# Patient Record
Sex: FEMALE | Race: WHITE | NOT HISPANIC OR LATINO | URBAN - METROPOLITAN AREA
[De-identification: names, ages, dates, MRNs, and addresses within clinical notes are randomized per-mention and may not be internally consistent; named-entity substitution may affect disease eponyms.]

---
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
## 2019-08-16 ENCOUNTER — APPOINTMENT (RX ONLY)
Dept: URBAN - METROPOLITAN AREA CLINIC 167 | Facility: CLINIC | Age: 19
Setting detail: DERMATOLOGY
End: 2019-08-16

## 2019-08-16 DIAGNOSIS — L53.1 ERYTHEMA ANNULARE CENTRIFUGUM: ICD-10-CM

## 2019-08-16 DIAGNOSIS — D22 MELANOCYTIC NEVI: ICD-10-CM

## 2019-08-16 DIAGNOSIS — L21.8 OTHER SEBORRHEIC DERMATITIS: ICD-10-CM

## 2019-08-16 PROBLEM — L30.9 DERMATITIS, UNSPECIFIED: Status: ACTIVE | Noted: 2019-08-16

## 2019-08-16 PROBLEM — D22.5 MELANOCYTIC NEVI OF TRUNK: Status: ACTIVE | Noted: 2019-08-16

## 2019-08-16 PROCEDURE — 87220 TISSUE EXAM FOR FUNGI: CPT

## 2019-08-16 PROCEDURE — ? PRESCRIPTION

## 2019-08-16 PROCEDURE — ? COUNSELING

## 2019-08-16 PROCEDURE — ? INVENTORY

## 2019-08-16 PROCEDURE — ? KOH PREP

## 2019-08-16 PROCEDURE — ? ADDITIONAL NOTES

## 2019-08-16 PROCEDURE — 99213 OFFICE O/P EST LOW 20 MIN: CPT

## 2019-08-16 RX ORDER — CICLOPIROX 1 %
SHAMPOO TOPICAL
Qty: 2 | Refills: 8 | Status: ERX

## 2019-08-16 RX ORDER — BETAMETHASONE VALERATE 1 MG/ML
LOTION CUTANEOUS
Qty: 1 | Refills: 6 | Status: ERX | COMMUNITY
Start: 2019-08-16

## 2019-08-16 RX ADMIN — BETAMETHASONE VALERATE: 1 LOTION CUTANEOUS at 00:00

## 2019-08-16 ASSESSMENT — LOCATION DETAILED DESCRIPTION DERM
LOCATION DETAILED: LEFT LATERAL ABDOMEN
LOCATION DETAILED: INFERIOR THORACIC SPINE
LOCATION DETAILED: RIGHT MEDIAL FRONTAL SCALP

## 2019-08-16 ASSESSMENT — LOCATION ZONE DERM
LOCATION ZONE: SCALP
LOCATION ZONE: TRUNK

## 2019-08-16 ASSESSMENT — LOCATION SIMPLE DESCRIPTION DERM
LOCATION SIMPLE: ABDOMEN
LOCATION SIMPLE: RIGHT SCALP
LOCATION SIMPLE: UPPER BACK

## 2019-08-16 NOTE — PROCEDURE: ADDITIONAL NOTES
Detail Level: Detailed
Additional Notes: Reassurance provided for patient.  Advised pt to use betamethasone valerate lotion.

## 2020-11-23 ENCOUNTER — APPOINTMENT (RX ONLY)
Dept: URBAN - METROPOLITAN AREA CLINIC 167 | Facility: CLINIC | Age: 20
Setting detail: DERMATOLOGY
End: 2020-11-23

## 2020-11-23 DIAGNOSIS — L21.8 OTHER SEBORRHEIC DERMATITIS: ICD-10-CM

## 2020-11-23 DIAGNOSIS — D22 MELANOCYTIC NEVI: ICD-10-CM

## 2020-11-23 PROBLEM — D23.71 OTHER BENIGN NEOPLASM OF SKIN OF RIGHT LOWER LIMB, INCLUDING HIP: Status: ACTIVE | Noted: 2020-11-23

## 2020-11-23 PROBLEM — D22.5 MELANOCYTIC NEVI OF TRUNK: Status: ACTIVE | Noted: 2020-11-23

## 2020-11-23 PROCEDURE — ? COUNSELING

## 2020-11-23 PROCEDURE — 99213 OFFICE O/P EST LOW 20 MIN: CPT

## 2020-11-23 PROCEDURE — ? PRESCRIPTION

## 2020-11-23 PROCEDURE — ? PRESCRIPTION MEDICATION MANAGEMENT

## 2020-11-23 PROCEDURE — ? FULL BODY SKIN EXAM

## 2020-11-23 RX ORDER — FLUCONAZOLE 150 MG/1
TABLET ORAL
Qty: 4 | Refills: 0 | Status: ERX | COMMUNITY
Start: 2020-11-23

## 2020-11-23 RX ORDER — CLOBETASOL PROPIONATE 0.5 MG/ML
SOLUTION TOPICAL
Qty: 1 | Refills: 3 | Status: ERX | COMMUNITY
Start: 2020-11-23

## 2020-11-23 RX ADMIN — FLUCONAZOLE: 150 TABLET ORAL at 00:00

## 2020-11-23 RX ADMIN — CLOBETASOL PROPIONATE: 0.5 SOLUTION TOPICAL at 00:00

## 2020-11-23 ASSESSMENT — LOCATION ZONE DERM
LOCATION ZONE: TRUNK
LOCATION ZONE: SCALP

## 2020-11-23 ASSESSMENT — LOCATION DETAILED DESCRIPTION DERM
LOCATION DETAILED: INFERIOR THORACIC SPINE
LOCATION DETAILED: RIGHT MEDIAL FRONTAL SCALP

## 2020-11-23 ASSESSMENT — LOCATION SIMPLE DESCRIPTION DERM
LOCATION SIMPLE: UPPER BACK
LOCATION SIMPLE: RIGHT SCALP

## 2020-11-23 NOTE — PROCEDURE: PRESCRIPTION MEDICATION MANAGEMENT
Detail Level: Zone
Render In Strict Bullet Format?: No
Initiate Treatment: Fluconazole 150mg once a week for 4 weeks\\nClobetasol scalp soln
Continue Regimen: Ciclopirox shampoo alternating with reg shampoo\\nBetamethasone valerate prn ears and face

## 2021-08-06 RX ORDER — CLOBETASOL PROPIONATE 0.5 MG/ML
SOLUTION TOPICAL
Qty: 1 | Refills: 3 | Status: ERX

## 2021-08-06 RX ORDER — FLUCONAZOLE 150 MG/1
TABLET ORAL
Qty: 4 | Refills: 0 | Status: ERX